# Patient Record
Sex: FEMALE | Race: WHITE
[De-identification: names, ages, dates, MRNs, and addresses within clinical notes are randomized per-mention and may not be internally consistent; named-entity substitution may affect disease eponyms.]

---

## 2017-03-06 ENCOUNTER — HOSPITAL ENCOUNTER (EMERGENCY)
Dept: HOSPITAL 10 - FTE | Age: 22
Discharge: HOME | End: 2017-03-06
Payer: COMMERCIAL

## 2017-03-06 VITALS
SYSTOLIC BLOOD PRESSURE: 120 MMHG | RESPIRATION RATE: 20 BRPM | DIASTOLIC BLOOD PRESSURE: 58 MMHG | TEMPERATURE: 98 F | HEART RATE: 68 BPM

## 2017-03-06 VITALS — HEIGHT: 55 IN | WEIGHT: 165.35 LBS | BODY MASS INDEX: 38.27 KG/M2

## 2017-03-06 DIAGNOSIS — R21: ICD-10-CM

## 2017-03-06 DIAGNOSIS — O99.89: Primary | ICD-10-CM

## 2017-03-06 DIAGNOSIS — Z3A.00: ICD-10-CM

## 2017-03-06 PROCEDURE — 99283 EMERGENCY DEPT VISIT LOW MDM: CPT

## 2017-03-06 NOTE — ERD
ER Documentation


Chief Complaint


Date/Time


DATE: 3/6/17 


TIME: 13:08


Chief Complaint


rash since last night with no distress. no sob. no ob complaints





HPI


Patient is a 21-year-old female who is  who presents to the ED with a rash 

on her face since this morning.  She states that the rash developed on its own 

and states that it is mildly itchy, not painful.  She denies fever or chills.  

She states that the rash is only located on her face.  Denies rashes anywhere 

else on her body.  Denies vomiting, abdominal pain, diarrhea, constipation.  

Denies drainage.  Denies shortness of breath or difficulty breathing, 

difficulty swallowing or difficulty speaking.  Denies chest pain.  Denies 

change in foods or recent travel or hygiene products. Denies new medications. 

She has no other complaints.  She has not tried any medications.





ROS


All systems reviewed and are negative except as per history of present illness.





Medications


Home Meds


Active Scripts


Cetirizine Hcl* (Zyrtec*) 10 Mg Capsule, 10 MG PO DAILY, #30 TAB.CHEW


   Prov:EDSON ACOSTA PA-C         3/6/17


Diphenhydramine Hcl* (Benadryl*) 25 Mg Cap, 25 MG PO Q6, #30 CAP


   Prov:EDSON ACOSTA PA-C         3/6/17


Acetaminophen* (Tylophen*) 500 Mg Capsule, 1 CAP PO Q6H Y for PAIN AND OR 

ELEVATED TEMP, #20 CAP


   Prov:DUNCAN COSME PA-C         11/3/16


Nitrofurantoin Monohyd Macrocr* (Macrobid*) 100 Mg Capsr, 100 MG PO BID for 7 

Days, CAP


   Prov:DUNCAN COSME PA-C         11/3/16


Ranitidine Hcl* (Zantac*) 150 Mg Tablet, 150 MG PO BID Y for EPIGASTRIC PAIN, #

20 TAB


   Prov:DUNCAN COSME PA-C         11/3/16


Reported Medications


Hydrocodone Bit-Acetaminophen* (Norco*) 1 Tab Tab


   10/3/13





Allergies


Allergies:  


Coded Allergies:  


     No Known Allergy (Unverified , 3/6/17)





PMhx/Soc


History of Surgery:  Yes (s/p ERCP and cholecystectomy on 13)


Anesthesia Reaction:  No


Hx Neurological Disorder:  No


Hx Respiratory Disorders:  No


Hx Cardiac Disorders:  No


Hx Psychiatric Problems:  No


Hx Miscellaneous Medical Probl:  No (gallstones)


Hx Alcohol Use:  No


Hx Substance Use:  No


Hx Tobacco Use:  No


Smoking Status:  Never smoker





Physical Exam


Vitals





Vital Signs








  Date Time  Temp Pulse Resp B/P Pulse Ox O2 Delivery O2 Flow Rate FiO2


 


3/6/17 10:45 98.0 82 21 126/56 100   








Physical Exam





GENERAL: Well-developed, well-nourished female. Appears in no acute distress. 

normal speech


HEAD: Normocephalic, atraumatic. 


EYES: Pupils are equally reactive bilaterally. EOMs grossly intact. No 

conjunctival erythema. 


ENT: Moist mucous membranes. No uvula deviation. No kissing tonsils. No 

exudates. no erythema. no tongue swelling or lip swelling.


SKIN: erythamous diffuse macular rash on face, no purpura or petechiae, non 

raised. no drainage, no warmth, no induration or fluctuance. 


NECK: Supple. No lymphadenopathy or thyromegaly. No meningismus. negative 

kernig. negative brudinski.  


LUNG: Clear to auscultation bilaterally. No rhonchi, wheezing, rales or coarse 

breath sounds. 


HEART: Regular rate and rhythm. No murmurs, rubs or gallops.


SKIN: Normal color. Warm and dry. No rashes or lesions. Capillary refill < 2 

seconds





Procedures/MDM


ER COURSE:


I kept the patient and/or family informed of laboratory and diagnostic imaging 

results throughout the emergency room course.





MEDICAL DECISION MAKING:


This is a 21 year old female who is  who presents to the ED who presents 

with rash to her face x 1 day. Vital signs were reviewed. Patient is afebrile. 

Patient is not hypoxic.  Patient is not toxic or ill-appearing.  Patient has 

rash of unknown etiology.  Low suspicion for necrotizing fasciitis, SJS, toxic 

epidermal necrolysis, Kawasaki, erythema multiforme, gangrene, scarlet fever, 

meningococcemia, sepsis, anaphylaxis, sepsis, deep space infection, or foreign 

body.  Low suspicion for cellulitis as it is not warm, no streaking. I have low 

suspicion for angioedema or anayphalaxis. No angioedema, speaking in full 

sentences, no shortness of breath, no tongue swelling. 








DISCHARGE:


At this time, patient is stable for discharge and outpatient management with no 

new complaints during the ER course. Patient was sent home with benadryl and 

zyrtec and to follow up with OB this week.. Patient will be discharged home 

with instructions to recheck for new or worsening symptoms such as fever, nausea

, weakness, LOC and to follow up with primary care in the next 1-2 days. 

Patient was advised to return to the ER for any new or worsening symptoms. Plan 

was discussed and patient and/or family understands and agrees. Home 

instructions were given.





Departure


Diagnosis:  


 Primary Impression:  


 Rash


Condition:  Stable


Patient Instructions:  Self-Care for Skin Rashes


Referrals:  


JULIANA RICH MD (PCP)





Additional Instructions:  


Call your primary care doctor TOMORROW for an appointment during the next 1-2 

days.See the doctor sooner or return here if your condition worsens before your 

appointment time.











EDSON ACOSTA PA-C Mar 6, 2017 13:14

## 2017-05-25 ENCOUNTER — HOSPITAL ENCOUNTER (OUTPATIENT)
Dept: HOSPITAL 10 - OBT | Age: 22
LOS: 1 days | Discharge: HOME | End: 2017-05-26
Attending: OBSTETRICS & GYNECOLOGY
Payer: COMMERCIAL

## 2017-05-25 VITALS — RESPIRATION RATE: 18 BRPM | DIASTOLIC BLOOD PRESSURE: 70 MMHG | HEART RATE: 69 BPM | SYSTOLIC BLOOD PRESSURE: 116 MMHG

## 2017-05-25 VITALS
BODY MASS INDEX: 40.95 KG/M2 | HEIGHT: 60 IN | WEIGHT: 208.56 LBS | WEIGHT: 208.56 LBS | HEIGHT: 60 IN | BODY MASS INDEX: 40.95 KG/M2

## 2017-05-25 DIAGNOSIS — Z3A.34: ICD-10-CM

## 2017-05-25 DIAGNOSIS — O26.893: Primary | ICD-10-CM

## 2017-05-25 DIAGNOSIS — R10.9: ICD-10-CM

## 2017-05-25 DIAGNOSIS — M54.9: ICD-10-CM

## 2017-05-25 LAB
ADD UMIC: NO
COLOR UR: (no result)
GLUCOSE UR STRIP-MCNC: NEGATIVE %
KETONES UR STRIP.AUTO-MCNC: NEGATIVE MG/DL
NITRITE UR QL STRIP.AUTO: NEGATIVE
RBC # UR AUTO: NEGATIVE /UL
URINE BILIRUBIN (DIP): NEGATIVE
URINE TOTAL PROTEIN (DIP): NEGATIVE
UROBILINOGEN UR STRIP-ACNC: (no result) (ref 0.1–1)
WBC # UR STRIP: NEGATIVE /UL

## 2017-05-25 PROCEDURE — 81003 URINALYSIS AUTO W/O SCOPE: CPT

## 2017-05-25 PROCEDURE — 96372 THER/PROPH/DIAG INJ SC/IM: CPT

## 2017-05-25 PROCEDURE — G0463 HOSPITAL OUTPT CLINIC VISIT: HCPCS

## 2017-05-26 NOTE — QN
Documentation


Comment


Laborist     Dr Galeas's pt





21 y.o.  with an IUP at 34w 5d c/o abdominal and back cramping. Pt has a 

placenta previa. No bleeding. + fetal movement. 


PMHx: none.


PSHx: none.


POBHx:  4 years ago.


NKDA.





T=98.2    /70


NST: baseline 140 bpm with accels to 160 bpm. No decels. UC's q 2 to 6 minutes 

then after 2 doses of terbutaline the contractions stopped and the pt reports 

feeling much better. She also had p.o. hydration. 


U/A negative.





A: IUP at 34w 5d.


False labor.


P: D/C home. 


Maintain hydration.


Reviewed PTL precautions and previa precautions.











ANTONIO ELIZONDO MD May 26, 2017 02:59

## 2017-05-26 NOTE — TRIAGE
===================================

OB Triage

===================================

Datetime Report Generated by CPN: 05/26/2017 03:14

   

   

===========================

Datetime: 05/26/2017 02:38

===========================

   

   

===================================

Labor Evaluation

===================================

   

 Frequency:  X1

 Monitor Mode:  External

 Duration (sec)2399:  70

 Quality:  Mild

 Resting Tone [place]:  Relaxed

   

===========================

Datetime: 05/26/2017 02:30

===========================

   

   

===================================

Labor Evaluation

===================================

   

 Frequency:  x2

 Monitor Mode:  External

 Duration (sec)2399:  40-60

 Quality:  Mild

 Resting Tone [place]:  Relaxed

 Contraction Comments:  Pt states she does not feel any contractions.

 Pain Presence:  None/Denies

 Pain Type:  N/A

   

===========================

Datetime: 05/26/2017 02:00

===========================

   

 Monitor Mode:  Palpation

 Resting Tone [place]:  Relaxed

 Contraction Comments:  No contractions palpated. Pt states she does not feel pain, cramping, or con
tractions.

   

===========================

Datetime: 05/26/2017 01:30

===========================

   

   

===================================

Labor Evaluation

===================================

   

 Frequency:  3-8; irregular

 Monitor Mode:  External

 Duration (sec)2399:  40-60

 Quality:  Mild

 Resting Tone [place]:  Relaxed

   

===========================

Datetime: 05/26/2017 01:11

===========================

   

 Pain Presence:  None/Denies

 Pain Type:  N/A

   

===========================

Datetime: 05/26/2017 00:30

===========================

   

   

===================================

Labor Evaluation

===================================

   

 Frequency:  Irregular; 2-3

 Monitor Mode:  External

 Duration (sec)2399:  40-60

 Quality:  Mild

 Pattern:  Normal: <= 5 Contractions in 10 Minutes

 Resting Tone [place]:  Relaxed

 Contraction Comments:  Terbutaline administered during this period.

   

===========================

Datetime: 05/26/2017 00:00

===========================

   

   

===================================

Labor Evaluation

===================================

   

 Frequency:  2-7

 Monitor Mode:  External

 Duration (sec)2399:  

 Quality:  Mild

 Pattern:  Normal: <= 5 Contractions in 10 Minutes

 Resting Tone [place]:  Relaxed

   

===========================

Datetime: 05/25/2017 23:41

===========================

   

   

===================================

Pain Assessment

===================================

   

 Pain Scale:  5

 Pain Presence:  Intermittent

 Pain Type:  Cramping; Contraction

 Pain Location:  Abdomen; Back

 Pain Relief Measures:  Comfort Measures

   

===========================

Datetime: 05/25/2017 23:25

===========================

   

   

===================================

Labor Evaluation

===================================

   

 Frequency:  3-6

 Monitor Mode:  External

 Duration (sec)2399:  50-70

 Quality:  Mild

 Pattern:  Normal: <= 5 Contractions in 10 Minutes

 Resting Tone [place]:  Relaxed

   

===========================

Datetime: 05/25/2017 23:05

===========================

   

   

===================================

Labor Evaluation

===================================

   

 Frequency:  N/A

 Monitor Mode:  External

 Resting Tone [place]:  Relaxed

 Contraction Comments:  [place] monitoring continued per MD order.

   

===================================

Fetal Heart Rate

===================================

   

 FHR Baseline Rate:  135

 Monitor Mode:  External US

 FHR Baseline Changes:  No Baseline Change

 Variability:  Moderate 6-25 bpm

 Accelerations:  15X15

 Decelerations:  None

 Category:  Category I

 Comments:  US D/C'd at this time per MD order.

   

===========================

Datetime: 05/25/2017 22:55

===========================

   

   

===================================

Labor Evaluation

===================================

   

 Frequency:  N/A

 Monitor Mode:  External

 Resting Tone [place]:  Relaxed

 Contraction Comments:  UTERINE ACTIVITY/IRRITABILITY NOTED

   

===================================

Fetal Heart Rate

===================================

   

 FHR Baseline Rate:  135

 Monitor Mode:  External US

 FHR Baseline Changes:  No Baseline Change

 Variability:  Moderate 6-25 bpm

 Accelerations:  15X15

 Decelerations:  None

 Category:  Category I

   

===========================

Datetime: 05/25/2017 21:55

===========================

   

   

===================================

Labor Evaluation

===================================

   

 Frequency:  N/A

 Monitor Mode:  External

 Resting Tone [place]:  Relaxed

 Contraction Comments:  Uterine activity/irritability noted.

   

===================================

Fetal Heart Rate

===================================

   

 FHR Baseline Rate:  140

 Monitor Mode:  External US

 FHR Baseline Changes:  No Baseline Change

 Variability:  Moderate 6-25 bpm

 Accelerations:  15X15

 Decelerations:  None

 Category:  Category I

   

===========================

Datetime: 05/25/2017 21:45

===========================

   

   

===================================

Pain Assessment

===================================

   

 Pain Scale:  0

 Pain Presence:  None/Denies

 Pain Type:  N/A

 Pain Assessment Comments:  Pt reports that she is "not really feeling cramping" at this time.

   

===========================

Datetime: 05/25/2017 21:03

===========================

   

   

===================================

Pain Assessment

===================================

   

 Pain Scale:  3

 Pain Presence:  Intermittent

 Pain Type:  Cramping

 Pain Location:  Abdomen; Back

 Pain Assessment Comments:  Pt reports that cramping pain is currently 3/10 and states that pain lev
el is less than the 6/10 pain earlier.

   

===========================

Datetime: 05/25/2017 20:55

===========================

   

   

===================================

Labor Evaluation

===================================

   

 Frequency:  N/A

 Monitor Mode:  External

 Resting Tone [place]:  Relaxed

 Contraction Comments:  Uterine activity noted.

   

===================================

Fetal Heart Rate

===================================

   

 FHR Baseline Rate:  145

 Monitor Mode:  External US

 FHR Baseline Changes:  No Baseline Change

 Variability:  Moderate 6-25 bpm

 Accelerations:  15X15

 Decelerations:  None

 Category:  Category I

   

===========================

Datetime: 05/25/2017 20:40

===========================

   

 EGA:  34.3

   

===========================

Datetime: 05/25/2017 20:20

===========================

   

 Stage of Pregnancy:  OB Triage

   

===================================

Maternal Assessment

===================================

   

 Level of Consciousness:  Fully Conscious

 DTR's/Clonus:  DTRs 2+; No Clonus

 Headache:  Denies

 Blurred Vision:  No

 Respiratory Effort:  Unlabored; Regular Rhythm; Equal Expansion

 Breath Sounds, Left:  Clear and Equal

 Breath Sounds, Right:  Clear and Equal

 Nausea/Vomiting:  Denies

 RUQ Epigastric Pain:  Denies

 Lower Extremities Edema:  None

     Degree:  None

 Upper Extremities Edema:  None

     Degree:  None

 Facial Edema:  None

 Temperature Route:  Oral

   

===================================

Fall Risk Assessment

===================================

   

 History of Falling:  (0) No

 Secondary Diagnosis:  (0) No

 Ambulatory Aid:  (0) Bedrest/Nurse Assist

 IV Therapy:  (0) No

 Gait:  (0) Normal/Bedrest/Immobile

 Mental Status:  (0) Oriented to Own Ability

 Fall Score:  0

 Fall Risk Score Definition:  No Risk: No action required

 Monitor Mode:  External

 Monitor Mode:  External US

   

===================================

Pain Assessment

===================================

   

 Pain Scale:  6

 Pain Presence:  Intermittent

 Pain Type:  Cramping

 Pain Location:  Abdomen; Back

   

===========================

Datetime: 05/25/2017 20:15

===========================

   

 Time of Arrival:  05/25/2017 20:15

 Arrived By:  Ambulatory

 Arrived From:  Emergency Dept

 Chief Complaint:  PLACENTA PREVIA

   STOMACH AND BACK CRAMPING

 Fetal Movement:  Present

 Contractions:  Irregular

 Time Contractions Began:  05/25/2017 16:00

 Contractions:  x5/hr

 Rupture of Membranes:  Denies

 Vaginal Bleeding:  None

 Vaginal Discharge:  Denies

 Recent Sexual Intercouse:  Denies

 Abdominal Trauma:  Not Applicable

 Patient Complaints:  Cramping; Back Pain

 Time Provider Notified:  05/25/2017 20:15

 Provider Notified:  MD ELIZONDO

 Initial Plan:  OBSERVATION, UA, PO HYDRATION, TERBUTALINE

## 2017-06-18 ENCOUNTER — HOSPITAL ENCOUNTER (INPATIENT)
Dept: HOSPITAL 10 - OBT | Age: 22
LOS: 3 days | Discharge: HOME | End: 2017-06-21
Attending: OBSTETRICS & GYNECOLOGY | Admitting: OBSTETRICS & GYNECOLOGY
Payer: COMMERCIAL

## 2017-06-18 VITALS — HEART RATE: 77 BPM | DIASTOLIC BLOOD PRESSURE: 79 MMHG | SYSTOLIC BLOOD PRESSURE: 121 MMHG

## 2017-06-18 VITALS — BODY MASS INDEX: 42.42 KG/M2 | WEIGHT: 216.05 LBS | HEIGHT: 60 IN

## 2017-06-18 DIAGNOSIS — E66.01: ICD-10-CM

## 2017-06-18 DIAGNOSIS — Z3A.37: ICD-10-CM

## 2017-06-18 LAB
ADD SCAN DIFF: NO
APTT BLD: 29.1 SEC (ref 25–35)
BASOPHILS # BLD AUTO: 0 10^3/UL (ref 0–0.1)
BASOPHILS NFR BLD: 0.1 % (ref 0–2)
EOSINOPHIL # BLD: 0 10^3/UL (ref 0–0.5)
EOSINOPHIL NFR BLD: 0.5 % (ref 0–7)
ERYTHROCYTE [DISTWIDTH] IN BLOOD BY AUTOMATED COUNT: 14.3 % (ref 11.5–14.5)
HCT VFR BLD CALC: 34.5 % (ref 37–47)
HGB BLD-MCNC: 11.3 G/DL (ref 12–16)
INR PPP: 0.91
LYMPHOCYTES # BLD AUTO: 2.4 10^3/UL (ref 0.8–2.9)
LYMPHOCYTES NFR BLD AUTO: 27.5 % (ref 15–51)
MCH RBC QN AUTO: 29.6 PG (ref 29–33)
MCHC RBC AUTO-ENTMCNC: 32.8 G/DL (ref 32–37)
MCV RBC AUTO: 90.3 FL (ref 82–101)
MONOCYTES # BLD: 0.4 10^3/UL (ref 0.3–0.9)
MONOCYTES NFR BLD: 4.7 % (ref 0–11)
NEUTROPHILS # BLD: 5.9 10^3/UL (ref 1.6–7.5)
NEUTROPHILS NFR BLD AUTO: 66.9 % (ref 39–77)
NRBC # BLD MANUAL: 0 10^3/UL (ref 0–0)
NRBC BLD QL: 0 /100WBC (ref 0–0)
PLATELET # BLD: 239 10^3/UL (ref 140–415)
PMV BLD AUTO: 10.4 FL (ref 7.4–10.4)
PROTHROMBIN TIME: 12.2 SEC (ref 12.2–14.2)
PT RATIO: 1
RBC # BLD AUTO: 3.82 10^6/UL (ref 4.2–5.4)
WBC # BLD AUTO: 8.9 10^3/UL (ref 4.8–10.8)

## 2017-06-18 PROCEDURE — 84112 EVAL AMNIOTIC FLUID PROTEIN: CPT

## 2017-06-18 PROCEDURE — 85025 COMPLETE CBC W/AUTO DIFF WBC: CPT

## 2017-06-18 PROCEDURE — 86592 SYPHILIS TEST NON-TREP QUAL: CPT

## 2017-06-18 PROCEDURE — 90715 TDAP VACCINE 7 YRS/> IM: CPT

## 2017-06-18 PROCEDURE — 87340 HEPATITIS B SURFACE AG IA: CPT

## 2017-06-18 PROCEDURE — 86900 BLOOD TYPING SEROLOGIC ABO: CPT

## 2017-06-18 PROCEDURE — 85610 PROTHROMBIN TIME: CPT

## 2017-06-18 PROCEDURE — 85730 THROMBOPLASTIN TIME PARTIAL: CPT

## 2017-06-18 PROCEDURE — 86901 BLOOD TYPING SEROLOGIC RH(D): CPT

## 2017-06-18 PROCEDURE — 90716 VAR VACCINE LIVE SUBQ: CPT

## 2017-06-18 PROCEDURE — 62319: CPT

## 2017-06-18 PROCEDURE — G0463 HOSPITAL OUTPT CLINIC VISIT: HCPCS

## 2017-06-18 RX ADMIN — PYRIDOXINE HYDROCHLORIDE SCH MLS/HR: 100 INJECTION, SOLUTION INTRAMUSCULAR; INTRAVENOUS at 19:42

## 2017-06-18 NOTE — HP
Date/Time of Note


Date/Time of Note


DATE: 17 


TIME: 19:01





OB - History


Hx of Present Pregnancy


Free Text/Dictation


C/O SROM at 1600 PM 2017


Estimated Due Date:  Barak 3, 2017


:  2


Para:  1


Prenatal Care:  Good Care


Ultrasounds:  Normal mid trimester US


Obstetrical Complications:  None


Medical Complications:  None





Past Family/Social History


*


Past Medical, Surgical, Family and Obstetric Histories reviewed from prenatal 

chart.





OB  Admission Exam


Vital Signs


Vital Signs





 Vital Signs








  Date Time  Temp Pulse Resp B/P Pulse Ox O2 Delivery O2 Flow Rate FiO2


 


17 17:50 99.1 77  121/79    











Physical Exam


HEENT:  WNL


Heart:  Rhythm Normal


Lungs:  Clear, Equal


Abdomen:  WNL


Extremities:  Normal


Reflexes:  Normal


Cervical Dilatation:  1cm


Effacement:  25%


Station:  -3


Membranes:  Ruptured


Amniotic Fluid:  Clear


Fetal Heart Rate:  130's


Accelerations:  Accelerations Present


Decelerations:  No Decelerations


Varibility:  Moderate


Contractions on Admission:  6-10 Minutes Apart


Date/Time Contractions Began:  2017 1600 PM


Frequency of Contractions:  q 5-10 min


Duration:  >30 seconds


Intensity:  Mild





OB  Assessment/Plan


Reason for admission:  rupture of membranes


Other Assessment:


37+ weeks gestation


Induction Method:  per Pitocin Protocol


Other plan:


start on ampicillin for GBS prophylaxis











SAJI THAO MD 2017 19:04

## 2017-06-19 VITALS — DIASTOLIC BLOOD PRESSURE: 82 MMHG | HEART RATE: 76 BPM | SYSTOLIC BLOOD PRESSURE: 117 MMHG | RESPIRATION RATE: 18 BRPM

## 2017-06-19 VITALS — SYSTOLIC BLOOD PRESSURE: 118 MMHG | HEART RATE: 74 BPM | DIASTOLIC BLOOD PRESSURE: 72 MMHG

## 2017-06-19 VITALS — SYSTOLIC BLOOD PRESSURE: 116 MMHG | HEART RATE: 66 BPM | RESPIRATION RATE: 18 BRPM | DIASTOLIC BLOOD PRESSURE: 70 MMHG

## 2017-06-19 PROCEDURE — 0HQ9XZZ REPAIR PERINEUM SKIN, EXTERNAL APPROACH: ICD-10-PCS | Performed by: OBSTETRICS & GYNECOLOGY

## 2017-06-19 RX ADMIN — IBUPROFEN SCH MG: 600 TABLET ORAL at 23:51

## 2017-06-19 RX ADMIN — CEPHALEXIN SCH MG: 500 CAPSULE ORAL at 23:51

## 2017-06-19 RX ADMIN — PYRIDOXINE HYDROCHLORIDE SCH MLS/HR: 100 INJECTION, SOLUTION INTRAMUSCULAR; INTRAVENOUS at 22:58

## 2017-06-19 RX ADMIN — DOCUSATE SODIUM AND SENNOSIDES SCH TAB: 8.6; 5 TABLET, FILM COATED ORAL at 20:22

## 2017-06-19 RX ADMIN — CEPHALEXIN SCH MG: 500 CAPSULE ORAL at 17:46

## 2017-06-19 RX ADMIN — IBUPROFEN SCH MG: 600 TABLET ORAL at 17:46

## 2017-06-19 RX ADMIN — AMPICILLIN SODIUM SCH MLS/HR: 1 INJECTION, POWDER, FOR SOLUTION INTRAMUSCULAR; INTRAVENOUS at 08:28

## 2017-06-19 RX ADMIN — AMPICILLIN SODIUM SCH MLS/HR: 1 INJECTION, POWDER, FOR SOLUTION INTRAMUSCULAR; INTRAVENOUS at 04:47

## 2017-06-19 RX ADMIN — AMPICILLIN SODIUM SCH MLS/HR: 1 INJECTION, POWDER, FOR SOLUTION INTRAMUSCULAR; INTRAVENOUS at 00:07

## 2017-06-19 RX ADMIN — PYRIDOXINE HYDROCHLORIDE SCH MLS/HR: 100 INJECTION, SOLUTION INTRAMUSCULAR; INTRAVENOUS at 12:38

## 2017-06-19 RX ADMIN — AMPICILLIN SODIUM SCH MLS/HR: 1 INJECTION, POWDER, FOR SOLUTION INTRAMUSCULAR; INTRAVENOUS at 12:37

## 2017-06-19 RX ADMIN — PYRIDOXINE HYDROCHLORIDE SCH MLS/HR: 100 INJECTION, SOLUTION INTRAMUSCULAR; INTRAVENOUS at 04:47

## 2017-06-19 RX ADMIN — PYRIDOXINE HYDROCHLORIDE SCH MLS/HR: 100 INJECTION, SOLUTION INTRAMUSCULAR; INTRAVENOUS at 22:52

## 2017-06-19 NOTE — LDN
Date/Time of Note


Date/Time of Note


DATE: 17 


TIME: 13:20





Delivery Summary


 of a viable infant over intact perineum


Weeks of Gestation


37+


Placenta Delivered:  Spontaneously, Intact & Complete


Meconium:  Particulate


Episiotomy:  No


Perineal laceration:  1


Laceration repair:  


superficiol perineal


laceration was repaired


with 2 0 Chromic


Anesthesia type:  Epidural


Estimated blood loss:  300


Sponge & Needle done & correct:  Yes


All needle counts correct:  Yes


Any foreign bodies felt in the:  No


Problems:  





Infant Delivery Information


Sex


Infant Sex:  female





Apgars


1 Minute:  9


5 Minute:  9





Suctioning


Nose & mouth suctioned at yaima:  Yes


Delee suction performed:  No





Umbilical Cord


Umbilical cord with:  3 Vessels


Cord presentations:  no nuchal cord


Cord Blood was obtained:  No





Mother & Baby Disposition


Disposition


Mom & Baby to Maternity; Good:  Yes (mother and baby werre recovered in good 

condition )


Mom transferred to:  Other (maternity )


Baby to NICU:  No











SAJI THAO MD 2017 13:23

## 2017-06-20 VITALS — RESPIRATION RATE: 18 BRPM | DIASTOLIC BLOOD PRESSURE: 62 MMHG | HEART RATE: 62 BPM | SYSTOLIC BLOOD PRESSURE: 108 MMHG

## 2017-06-20 VITALS — SYSTOLIC BLOOD PRESSURE: 98 MMHG | DIASTOLIC BLOOD PRESSURE: 68 MMHG | HEART RATE: 68 BPM | RESPIRATION RATE: 18 BRPM

## 2017-06-20 VITALS — HEART RATE: 64 BPM | RESPIRATION RATE: 18 BRPM | DIASTOLIC BLOOD PRESSURE: 65 MMHG | SYSTOLIC BLOOD PRESSURE: 112 MMHG

## 2017-06-20 VITALS — RESPIRATION RATE: 16 BRPM | HEART RATE: 54 BPM | DIASTOLIC BLOOD PRESSURE: 66 MMHG | SYSTOLIC BLOOD PRESSURE: 103 MMHG

## 2017-06-20 VITALS — RESPIRATION RATE: 18 BRPM | HEART RATE: 70 BPM

## 2017-06-20 LAB
ADD SCAN DIFF: NO
BASOPHILS # BLD AUTO: 0 10^3/UL (ref 0–0.1)
BASOPHILS NFR BLD: 0.1 % (ref 0–2)
EOSINOPHIL # BLD: 0 10^3/UL (ref 0–0.5)
EOSINOPHIL NFR BLD: 0.4 % (ref 0–7)
ERYTHROCYTE [DISTWIDTH] IN BLOOD BY AUTOMATED COUNT: 14.5 % (ref 11.5–14.5)
HCT VFR BLD CALC: 33.7 % (ref 37–47)
HGB BLD-MCNC: 11 G/DL (ref 12–16)
LYMPHOCYTES # BLD AUTO: 1.4 10^3/UL (ref 0.8–2.9)
LYMPHOCYTES NFR BLD AUTO: 18.3 % (ref 15–51)
MCH RBC QN AUTO: 29.3 PG (ref 29–33)
MCHC RBC AUTO-ENTMCNC: 32.6 G/DL (ref 32–37)
MCV RBC AUTO: 89.6 FL (ref 82–101)
MONOCYTES # BLD: 0.3 10^3/UL (ref 0.3–0.9)
MONOCYTES NFR BLD: 4.2 % (ref 0–11)
NEUTROPHILS # BLD: 5.9 10^3/UL (ref 1.6–7.5)
NEUTROPHILS NFR BLD AUTO: 76.7 % (ref 39–77)
NRBC # BLD MANUAL: 0 10^3/UL (ref 0–0)
NRBC BLD QL: 0 /100WBC (ref 0–0)
PLATELET # BLD: 202 10^3/UL (ref 140–415)
PMV BLD AUTO: 10.2 FL (ref 7.4–10.4)
RBC # BLD AUTO: 3.76 10^6/UL (ref 4.2–5.4)
WBC # BLD AUTO: 7.7 10^3/UL (ref 4.8–10.8)

## 2017-06-20 RX ADMIN — DOCUSATE SODIUM AND SENNOSIDES SCH TAB: 8.6; 5 TABLET, FILM COATED ORAL at 09:25

## 2017-06-20 RX ADMIN — IBUPROFEN SCH MG: 600 TABLET ORAL at 06:07

## 2017-06-20 RX ADMIN — CEPHALEXIN SCH MG: 500 CAPSULE ORAL at 06:07

## 2017-06-20 RX ADMIN — IBUPROFEN SCH MG: 600 TABLET ORAL at 23:30

## 2017-06-20 RX ADMIN — IBUPROFEN SCH MG: 600 TABLET ORAL at 11:43

## 2017-06-20 RX ADMIN — IBUPROFEN SCH MG: 600 TABLET ORAL at 17:52

## 2017-06-20 RX ADMIN — CEPHALEXIN SCH MG: 500 CAPSULE ORAL at 11:43

## 2017-06-20 RX ADMIN — CEPHALEXIN SCH MG: 500 CAPSULE ORAL at 17:53

## 2017-06-20 RX ADMIN — DOCUSATE SODIUM AND SENNOSIDES SCH TAB: 8.6; 5 TABLET, FILM COATED ORAL at 21:23

## 2017-06-20 NOTE — PD.PPDC
OB/GYN Discharge Instruction


Provider Information


Physician Information


20 y/o female had vaginal delivery





Diagnosis


Final Diagnosis:  S/P vaginal delivery





Condition


Patient Condition:  Good





Diet


Diet:  Resume Regular Diet





Activity/Restrictions








Activity:   Normal Activity





 May Shower














Restrictions:   Nothing in the Vagina





Return to Work or School:  Aug 7, 2017





Follow-up


Follow-up with Physician:  4, Week/Weeks





Return to clinic for








OB Instructions:   Breast Tenderness





 Depression

















SAJI THAO MD Jun 20, 2017 20:33

## 2017-06-20 NOTE — DS
Date/Time of Note


Date/Time of Note


home next day 


DATE: 6/20/17 


TIME: 20:29





Obstetrical Discharge Record


Final Diagnosis


Final Diagnosis:  Term delivered


Other Final Diagnosis


S/P vaginal delivery





Vaginal Delivery


Obstetrical Delivery:  Spontaneous, Laceration, Repaired





Complications


Augmentation:  Yes





Condition on Discharge


Physical Assessment


Last Vitals:


see nurses notes


Voiding:  Yes


Bowel Movement:  Yes


Breast:  Soft, non-tender, Filling


Fundus:  Firm


Abdomen and Incision:


soft BS +


Episiotomy:


NA


perineum: healing


Calf Tenderness:  No


Patient Condition:  Good











SAJI THAO MD Jun 20, 2017 20:32

## 2017-06-21 VITALS — DIASTOLIC BLOOD PRESSURE: 78 MMHG | HEART RATE: 73 BPM | SYSTOLIC BLOOD PRESSURE: 122 MMHG | RESPIRATION RATE: 18 BRPM

## 2017-06-21 VITALS — RESPIRATION RATE: 18 BRPM | SYSTOLIC BLOOD PRESSURE: 116 MMHG | HEART RATE: 74 BPM | DIASTOLIC BLOOD PRESSURE: 76 MMHG

## 2017-06-21 RX ADMIN — IBUPROFEN SCH MG: 600 TABLET ORAL at 06:50

## 2017-06-21 RX ADMIN — CEPHALEXIN SCH MG: 500 CAPSULE ORAL at 12:18

## 2017-06-21 RX ADMIN — CEPHALEXIN SCH MG: 500 CAPSULE ORAL at 06:50

## 2017-06-21 RX ADMIN — IBUPROFEN SCH MG: 600 TABLET ORAL at 12:18

## 2017-06-21 RX ADMIN — DOCUSATE SODIUM AND SENNOSIDES SCH TAB: 8.6; 5 TABLET, FILM COATED ORAL at 09:17

## 2017-06-21 RX ADMIN — CEPHALEXIN SCH MG: 500 CAPSULE ORAL at 00:04

## 2017-12-02 ENCOUNTER — HOSPITAL ENCOUNTER (EMERGENCY)
Dept: HOSPITAL 10 - FTE | Age: 22
Discharge: HOME | End: 2017-12-02
Payer: COMMERCIAL

## 2017-12-02 VITALS
HEART RATE: 66 BPM | SYSTOLIC BLOOD PRESSURE: 116 MMHG | TEMPERATURE: 98.3 F | RESPIRATION RATE: 16 BRPM | DIASTOLIC BLOOD PRESSURE: 80 MMHG

## 2017-12-02 VITALS
HEIGHT: 61 IN | BODY MASS INDEX: 39.13 KG/M2 | HEIGHT: 61 IN | WEIGHT: 207.23 LBS | BODY MASS INDEX: 39.13 KG/M2 | WEIGHT: 207.23 LBS

## 2017-12-02 DIAGNOSIS — R10.13: Primary | ICD-10-CM

## 2017-12-02 LAB
ALBUMIN SERPL-MCNC: 4.2 G/DL (ref 3.3–4.9)
ALBUMIN/GLOB SERPL: 1.5 {RATIO}
ALP SERPL-CCNC: 95 IU/L (ref 42–121)
ALT SERPL-CCNC: 51 IU/L (ref 13–69)
ANION GAP SERPL CALC-SCNC: 14 MMOL/L (ref 8–16)
AST SERPL-CCNC: 36 IU/L (ref 15–46)
BASOPHILS # BLD AUTO: 0 10^3/UL (ref 0–0.1)
BASOPHILS NFR BLD: 0.2 % (ref 0–2)
BILIRUB DIRECT SERPL-MCNC: 0 MG/DL (ref 0–0.2)
BILIRUB SERPL-MCNC: 0.2 MG/DL (ref 0.2–1.3)
BUN SERPL-MCNC: 12 MG/DL (ref 7–20)
CALCIUM SERPL-MCNC: 8.9 MG/DL (ref 8.4–10.2)
CHLORIDE SERPL-SCNC: 103 MMOL/L (ref 97–110)
CO2 SERPL-SCNC: 26 MMOL/L (ref 21–31)
CREAT SERPL-MCNC: 0.58 MG/DL (ref 0.44–1)
EOSINOPHIL # BLD: 0.1 10^3/UL (ref 0–0.5)
EOSINOPHIL NFR BLD: 1.5 % (ref 0–7)
ERYTHROCYTE [DISTWIDTH] IN BLOOD BY AUTOMATED COUNT: 13 % (ref 11.5–14.5)
GLOBULIN SER-MCNC: 2.8 G/DL (ref 1.3–3.2)
GLUCOSE SERPL-MCNC: 87 MG/DL (ref 70–220)
HCT VFR BLD CALC: 39.7 % (ref 37–47)
HGB BLD-MCNC: 13.2 G/DL (ref 12–16)
LYMPHOCYTES # BLD AUTO: 1.5 10^3/UL (ref 0.8–2.9)
LYMPHOCYTES NFR BLD AUTO: 22.9 % (ref 15–51)
MCH RBC QN AUTO: 30.8 PG (ref 29–33)
MCHC RBC AUTO-ENTMCNC: 33.2 G/DL (ref 32–37)
MCV RBC AUTO: 92.5 FL (ref 82–101)
MONOCYTES # BLD: 0.3 10^3/UL (ref 0.3–0.9)
MONOCYTES NFR BLD: 4.1 % (ref 0–11)
NEUTROPHILS # BLD: 4.7 10^3/UL (ref 1.6–7.5)
NEUTROPHILS NFR BLD AUTO: 71.1 % (ref 39–77)
NRBC # BLD MANUAL: 0 10^3/UL (ref 0–0)
NRBC BLD AUTO-RTO: 0 /100WBC (ref 0–0)
PLATELET # BLD: 278 10^3/UL (ref 140–415)
PMV BLD AUTO: 10.4 FL (ref 7.4–10.4)
POTASSIUM SERPL-SCNC: 4.1 MMOL/L (ref 3.5–5.1)
PROT SERPL-MCNC: 7 G/DL (ref 6.1–8.1)
RBC # BLD AUTO: 4.29 10^6/UL (ref 4.2–5.4)
SODIUM SERPL-SCNC: 139 MMOL/L (ref 135–144)
WBC # BLD AUTO: 6.6 10^3/UL (ref 4.8–10.8)

## 2017-12-02 PROCEDURE — 76705 ECHO EXAM OF ABDOMEN: CPT

## 2017-12-02 PROCEDURE — 80053 COMPREHEN METABOLIC PANEL: CPT

## 2017-12-02 PROCEDURE — 83690 ASSAY OF LIPASE: CPT

## 2017-12-02 PROCEDURE — 85025 COMPLETE CBC W/AUTO DIFF WBC: CPT

## 2017-12-02 PROCEDURE — 96374 THER/PROPH/DIAG INJ IV PUSH: CPT

## 2017-12-02 PROCEDURE — 71010: CPT

## 2017-12-02 PROCEDURE — 36415 COLL VENOUS BLD VENIPUNCTURE: CPT

## 2017-12-02 NOTE — RADRPT
PROCEDURE:   US Abdomen. 

 

CLINICAL INDICATION:   Abdominal Pain 

 

TECHNIQUE:   Multiple real-time images were acquired of the patient's abdomen and retroperitoneum ut
ilizing a high resolution transducer. 

 

COMPARISON:   None 

 

FINDINGS:

The liver is of normal size, contour and echogenicity with no mass or intrahepatic ductal dilatation
. Common bile duct measures 3 mm in transverse plane. Gallbladder has been removed. Limited visualiz
ation of pancreas is normal with no mass or ductal dilatation. There is no ascites. Portal vein is p
atent on color flow Doppler imaging.

 

IMPRESSION:

Post cholecystectomy. No evidence of biliary obstruction.

_____________________________________________ 

.Georges Ibrahim MD, MD           Date    Time 

Electronically viewed and signed by .Georges Ibrahim MD, MD on 12/02/2017 18:14 

 

D:  12/02/2017 18:14  T:  12/02/2017 18:14

.A/

## 2017-12-02 NOTE — RADRPT
PROCEDURE:   XR Chest. 

 

CLINICAL INDICATION:  Abdominal pain

 

TECHNIQUE:   Frontal chest x-ray was obtained. 

 

COMPARISON:   Chest x-ray November 25, 2013 

 

FINDINGS:

The heart is not enlarged. Mediastinum is not widened. No hilar masses seen. Lungs are clear of any 
infiltrate or mass. There is no effusion or pneumothorax .

 

IMPRESSION:

No evidence for active cardiopulmonary disease.

_____________________________________________ 

.Georges Ibrahim MD, MD           Date    Time 

Electronically viewed and signed by .Georges Ibrahim MD, MD on 12/02/2017 20:58 

 

D:  12/02/2017 20:58  T:  12/02/2017 20:58

.A/

## 2018-09-22 ENCOUNTER — HOSPITAL ENCOUNTER (EMERGENCY)
Dept: HOSPITAL 91 - FTE | Age: 23
Discharge: HOME | End: 2018-09-22
Payer: COMMERCIAL

## 2018-09-22 ENCOUNTER — HOSPITAL ENCOUNTER (EMERGENCY)
Age: 23
Discharge: HOME | End: 2018-09-22

## 2018-09-22 DIAGNOSIS — R10.13: Primary | ICD-10-CM

## 2018-09-22 DIAGNOSIS — R11.2: ICD-10-CM

## 2018-09-22 LAB
ADD MAN DIFF?: NO
ADD UMIC: YES
ALANINE AMINOTRANSFERASE: 41 IU/L (ref 13–69)
ALBUMIN/GLOBULIN RATIO: 1.13
ALBUMIN: 4.1 G/DL (ref 3.3–4.9)
ALKALINE PHOSPHATASE: 108 IU/L (ref 42–121)
ANION GAP: 11 (ref 8–16)
ASPARTATE AMINO TRANSFERASE: 29 IU/L (ref 15–46)
BASOPHIL #: 0 10^3/UL (ref 0–0.1)
BASOPHILS %: 0.2 % (ref 0–2)
BILIRUBIN,DIRECT: 0 MG/DL (ref 0–0.2)
BILIRUBIN,TOTAL: 0.2 MG/DL (ref 0.2–1.3)
BLOOD UREA NITROGEN: 11 MG/DL (ref 7–20)
CALCIUM: 9.4 MG/DL (ref 8.4–10.2)
CARBON DIOXIDE: 28 MMOL/L (ref 21–31)
CHLORIDE: 105 MMOL/L (ref 97–110)
CREATININE: 0.51 MG/DL (ref 0.44–1)
EOSINOPHILS #: 0.2 10^3/UL (ref 0–0.5)
EOSINOPHILS %: 1.1 % (ref 0–7)
GLOBULIN: 3.6 G/DL (ref 1.3–3.2)
GLUCOSE: 101 MG/DL (ref 70–220)
HEMATOCRIT: 41.2 % (ref 37–47)
HEMOGLOBIN: 13.4 G/DL (ref 12–16)
IMMATURE GRANS #M: 0.06 10^3/UL (ref 0–0.03)
IMMATURE GRANS % (M): 0.4 % (ref 0–0.43)
LIPASE: 129 U/L (ref 23–300)
LYMPHOCYTES #: 3 10^3/UL (ref 0.8–2.9)
LYMPHOCYTES %: 22.2 % (ref 15–51)
MEAN CORPUSCULAR HEMOGLOBIN: 31.3 PG (ref 29–33)
MEAN CORPUSCULAR HGB CONC: 32.5 G/DL (ref 32–37)
MEAN CORPUSCULAR VOLUME: 96.3 FL (ref 82–101)
MEAN PLATELET VOLUME: 9.8 FL (ref 7.4–10.4)
MONOCYTE #: 0.5 10^3/UL (ref 0.3–0.9)
MONOCYTES %: 4 % (ref 0–11)
NEUTROPHIL #: 9.8 10^3/UL (ref 1.6–7.5)
NEUTROPHILS %: 72.1 % (ref 39–77)
NUCLEATED RED BLOOD CELLS #: 0 10^3/UL (ref 0–0)
NUCLEATED RED BLOOD CELLS%: 0 /100WBC (ref 0–0)
PLATELET COUNT: 265 10^3/UL (ref 140–415)
POTASSIUM: 4 MMOL/L (ref 3.5–5.1)
RED BLOOD COUNT: 4.28 10^6/UL (ref 4.2–5.4)
RED CELL DISTRIBUTION WIDTH: 12.7 % (ref 11.5–14.5)
SODIUM: 140 MMOL/L (ref 135–144)
TOTAL PROTEIN: 7.7 G/DL (ref 6.1–8.1)
UR ASCORBIC ACID: NEGATIVE MG/DL
UR BILIRUBIN (DIP): NEGATIVE MG/DL
UR BLOOD (DIP): (no result) MG/DL
UR CLARITY: (no result)
UR COLOR: YELLOW
UR GLUCOSE (DIP): NEGATIVE MG/DL
UR KETONES (DIP): (no result) MG/DL
UR LEUKOCYTE ESTERASE (DIP): (no result) LEU/UL
UR MUCUS: (no result) /HPF
UR NITRITE (DIP): NEGATIVE MG/DL
UR PH (DIP): 5 (ref 5–9)
UR RBC: 19 /HPF (ref 0–5)
UR SPECIFIC GRAVITY (DIP): 1.03 (ref 1–1.03)
UR SQUAMOUS EPITHELIAL CELL: (no result) /HPF
UR TOTAL PROTEIN (DIP): NEGATIVE MG/DL
UR UROBILINOGEN (DIP): (no result) MG/DL
UR WBC: 37 /HPF (ref 0–5)
WHITE BLOOD COUNT: 13.6 10^3/UL (ref 4.8–10.8)

## 2018-09-22 PROCEDURE — 36415 COLL VENOUS BLD VENIPUNCTURE: CPT

## 2018-09-22 PROCEDURE — 83690 ASSAY OF LIPASE: CPT

## 2018-09-22 PROCEDURE — 81001 URINALYSIS AUTO W/SCOPE: CPT

## 2018-09-22 PROCEDURE — 76705 ECHO EXAM OF ABDOMEN: CPT

## 2018-09-22 PROCEDURE — 85025 COMPLETE CBC W/AUTO DIFF WBC: CPT

## 2018-09-22 PROCEDURE — 99284 EMERGENCY DEPT VISIT MOD MDM: CPT

## 2018-09-22 PROCEDURE — 81025 URINE PREGNANCY TEST: CPT

## 2018-09-22 PROCEDURE — 80053 COMPREHEN METABOLIC PANEL: CPT

## 2018-09-22 RX ADMIN — FAMOTIDINE 1 MG: 20 TABLET ORAL at 22:30

## 2018-09-22 RX ADMIN — ONDANSETRON 1 MG: 4 TABLET, ORALLY DISINTEGRATING ORAL at 22:30

## 2018-09-22 RX ADMIN — CEPHALEXIN 1 MG: 500 CAPSULE ORAL at 23:42

## 2018-09-22 RX ADMIN — HYDROCODONE BITARTRATE AND ACETAMINOPHEN 1 TAB: 5; 325 TABLET ORAL at 22:30
